# Patient Record
Sex: FEMALE | Race: BLACK OR AFRICAN AMERICAN | NOT HISPANIC OR LATINO | URBAN - METROPOLITAN AREA
[De-identification: names, ages, dates, MRNs, and addresses within clinical notes are randomized per-mention and may not be internally consistent; named-entity substitution may affect disease eponyms.]

---

## 2017-10-30 RX ORDER — SIMVASTATIN 20 MG/1
1 TABLET, FILM COATED ORAL
Qty: 0 | Refills: 0 | COMMUNITY

## 2017-10-30 RX ORDER — CHLORHEXIDINE GLUCONATE 213 G/1000ML
1 SOLUTION TOPICAL ONCE
Qty: 0 | Refills: 0 | Status: DISCONTINUED | OUTPATIENT
Start: 2017-11-01 | End: 2017-11-01

## 2017-10-30 NOTE — H&P ADULT - NSHPLABSRESULTS_GEN_ALL_CORE
12.3   4.8   )-----------( 236      ( 01 Nov 2017 07:28 )             38.1     11-01    142  |  101  |  18  ----------------------------<  157<H>  5.3   |  28  |  1.08    Ca    10.6<H>      01 Nov 2017 07:28      PT/INR - ( 01 Nov 2017 07:28 )   PT: 10.9 sec;   INR: 0.98          PTT - ( 01 Nov 2017 07:28 )  PTT:34.4 sec    CARDIAC MARKERS ( 01 Nov 2017 07:28 )  x     / x     / 117 U/L / x     / 2.4 ng/mL

## 2017-10-30 NOTE — H&P ADULT - ASSESSMENT
61 y/o F w/ FMHX of CAD, PMHX HTN, HLD, DM, H/o PUD s/p Upper GI Bleed 3-4 yrs ago treated w/ cauterization by EGD, H/o Anemia, Chronic Lower Back Pain w/ CCS Angina Equivalent Class 3 Sx and abnormal NST.     Due to patient's risk factors, CCS Angina Class III Equivalent Symptoms and abnormal Nuclear Stress Test, pt referred for cardiac cath. w/ possible intervention.       H/H 12.3/38 today. H/H is stable and was 12.2/38 on 9/16/17. Pt reports h/o EGD/Colonoscopy last year showing no bleed. Since her EGD w/ cauterization of upper GI bleed 3-4 yrs ago pt denies melena, BRBPR, hematemesis.   Pt denies current bleeding, GI bleeding, hematemesis, hematuria, BRBPR or melena.   ASA 325mg and Plavix 600mg PO pre-cath as per Dr. Garcia.  Cr. 1.08, IV NS@ 75cc/hr pre-cath.  K 5.3, no hemolysis, Dr. Garcia aware pt on Lisinopril 10mg daily.   Risks & benefits of procedure and alternative therapy have been explained to the patient including but not limited to: allergic reaction, bleeding w/possible need for blood transfusion, infection, renal and vascular compromise, limb damage, arrhythmia, stroke, vessel dissection/perforation, Myocardial infarction, emergent CABG. Informed consent obtained and in chart.     Case discussed w/ Dr. Garcia. 63 y/o F w/ FMHX of CAD, PMHX HTN, HLD, DM, H/o PUD s/p Upper GI Bleed 3-4 yrs ago treated w/ cauterization by EGD, H/o Anemia, Chronic Lower Back Pain w/ CCS Angina Equivalent Class 3 Sx and abnormal NST.     Due to patient's risk factors, CCS Angina Class III Equivalent Symptoms and abnormal Nuclear Stress Test, pt referred for cardiac cath. w/ possible intervention.       H/H 12.3/38 today. H/H is stable and was 12.2/38 on 9/16/17. Pt reports h/o EGD/Colonoscopy last year showing no bleed. Since her EGD w/ cauterization of upper GI bleed 3-4 yrs ago pt denies melena, BRBPR, hematemesis.   Pt denies current bleeding, GI bleeding, hematemesis, hematuria, BRBPR or melena.   ASA 325mg and Plavix 600mg PO pre-cath as per Dr. Garcia.  Cr. 1.08, IV NS@ 75cc/hr pre-cath.  K 5.3, no hemolysis, Dr. Garcia aware pt on Lisinopril 10mg daily.   Sedation Plan: Moderate  Patient is Suitable Candidate for Sedation: Yes  Risks & benefits of procedure and alternative therapy have been explained to the patient including but not limited to: allergic reaction, bleeding w/possible need for blood transfusion, infection, renal and vascular compromise, limb damage, arrhythmia, stroke, vessel dissection/perforation, Myocardial infarction, emergent CABG. Informed consent obtained and in chart.     Case discussed w/ Dr. Garcia.

## 2017-10-30 NOTE — H&P ADULT - NSHPREVIEWOFSYSTEMS_GEN_ALL_CORE
GEN Denies fever, chills,  SKIN Denies rash, pruritis, lesions/sores   HEENT Denies HA, vision changes, hearing changes  LUNG Denies cough, sputum, wheeze, hemoptysis, pleurisy  CV  + SOB, Denies C/P, palpitations, orthopnea, PND, claudication, syncope  GI Denies abdominal pain, dysphagia, GERD, N/V/D, melena, BRBPR, hematemesis   URINARY Denies polyuria, urgency, dysuria, hematuria, 		  MSK +Back Pain, Denies gait abnl, limited ROM  NEUROLOGY +LE numbness/tingling, Denies fainting, seizures, tremor, speech changes  PSYCH Denies depression, anxiety, hallucinations, suicidal ideation

## 2017-10-30 NOTE — H&P ADULT - HISTORY OF PRESENT ILLNESS
60 yo female with a PMHx of HTN, hyperlipidemia, DMII presented to cardiologist with the complaint of MONTERO.     Nuclear Stress Test (10/6/2017) revealed small sized, mild intensity, reversible defect in the apical anterior wall suggestive of ischemia. LVEF 77%    In light of patient's risk factors, CCS Angina Class III Equivalent Symptoms and abnormal Nuclear Stress Test, pt now presents for cardiac catheterization with possible intervention if clinically indicated. 63 y/o F w/ FMHX of CAD, PMHX HTN, HLD, DM, H/o PUD s/p Upper GI Bleed 3-4 yrs ago treated w/ cauterization by EGD (reports h/o EGD/Colonoscopy last year showing no bleed), H/o Anemia diagnosed 3-4 yrs ago during upper GI bleed (denies h/o blood transfusion, currently on Iron supplement and monthly B12 injections, last H/H 12.2/38.3 on 9/16/17 per her MD office), Chronic Lower Back Pain 2nd to Herniated Disc and Prior Fall, who presented to her cardiologist c/o MONTERO on walking 2 blocks or when climbing 2 flights of stairs (CCS Angina Equivalent Class 3 Sx). Associated sx include dizziness, diaphoresis and fatigue. Pt denies CP, palpitations, orthopnea, PND, syncope.  Pt had a Nuclear Stress Test (10/6/2017) revealing a small sized, mild intensity, reversible defect in the apical anterior wall suggestive of ischemia. LVEF 77%. Since her EGD w/ cauterization of upper GI bleed 3-4 yrs ago pt denies melena, BRBPR, hematemesis.    Due to patient's risk factors, CCS Angina Class III Equivalent Symptoms and abnormal Nuclear Stress Test, pt referred for cardiac cath. w/ possible intervention.     Of note, pt reports h/o small R fibroid cyst (no surgical intervention required) which causes intermittent mild R sided groin/pelvic pain.

## 2017-10-30 NOTE — H&P ADULT - FAMILY HISTORY
Father  Still living? Unknown  Family history of CHF (congestive heart failure), Age at diagnosis: Age Unknown     Sibling  Still living? Unknown  Family history of heart disease, Age at diagnosis: Age Unknown Father  Still living? Unknown  Family history of CHF (congestive heart failure), Age at diagnosis: Age Unknown     Sibling  Still living? Unknown  Family history of heart disease, Age at diagnosis: Age Unknown  Family history of acute myocardial infarction, Age at diagnosis: 51-60     Sibling  Still living? Unknown  Family history of acute myocardial infarction, Age at diagnosis: 41-50

## 2017-10-30 NOTE — H&P ADULT - NSHPPHYSICALEXAM_GEN_ALL_CORE
V/S 	BP: 150/87 	  HR: 58	    RR: 18    T: 97.5	  O2: 100% RA   	  GEN: NAD  PULM:  CTA B/L  CARD: No JVD B/L, RRR, S1 and S2   ABD: +BS, NT, soft/ND	  EXT: No Edema B/L LE  NEURO: A+Ox3, no focal deficit  PULSES: 2+ Radial b/l, 2+ Femoral b/l (no bruit b/l), DP 2+ b/l, R PT 1+, L PT Faint   ASA III, Mallampati III  EKG: Sinus Bradycardia @ 58bpm w/ j point elevation 0.5mm in V2, TWI in AVL

## 2017-11-01 ENCOUNTER — OUTPATIENT (OUTPATIENT)
Dept: OUTPATIENT SERVICES | Facility: HOSPITAL | Age: 62
LOS: 1 days | Discharge: NURSING, REHAB OR LONG-TERM CARE | End: 2017-11-01
Payer: COMMERCIAL

## 2017-11-01 DIAGNOSIS — Z98.42 CATARACT EXTRACTION STATUS, LEFT EYE: Chronic | ICD-10-CM

## 2017-11-01 LAB
ANION GAP SERPL CALC-SCNC: 13 MMOL/L — SIGNIFICANT CHANGE UP (ref 5–17)
APTT BLD: 34.4 SEC — SIGNIFICANT CHANGE UP (ref 27.5–37.4)
BASOPHILS NFR BLD AUTO: 0.2 % — SIGNIFICANT CHANGE UP (ref 0–2)
BUN SERPL-MCNC: 18 MG/DL — SIGNIFICANT CHANGE UP (ref 7–23)
CALCIUM SERPL-MCNC: 10.6 MG/DL — HIGH (ref 8.4–10.5)
CHLORIDE SERPL-SCNC: 101 MMOL/L — SIGNIFICANT CHANGE UP (ref 96–108)
CHOLEST SERPL-MCNC: 139 MG/DL — SIGNIFICANT CHANGE UP (ref 10–199)
CK MB CFR SERPL CALC: 2.4 NG/ML — SIGNIFICANT CHANGE UP (ref 0–6.7)
CO2 SERPL-SCNC: 28 MMOL/L — SIGNIFICANT CHANGE UP (ref 22–31)
CREAT SERPL-MCNC: 1.08 MG/DL — SIGNIFICANT CHANGE UP (ref 0.5–1.3)
CRP SERPL-MCNC: 1.3 MG/DL — HIGH (ref 0–0.4)
EOSINOPHIL NFR BLD AUTO: 3.5 % — SIGNIFICANT CHANGE UP (ref 0–6)
GLUCOSE BLDC GLUCOMTR-MCNC: 128 MG/DL — HIGH (ref 70–99)
GLUCOSE BLDC GLUCOMTR-MCNC: 69 MG/DL — LOW (ref 70–99)
GLUCOSE SERPL-MCNC: 157 MG/DL — HIGH (ref 70–99)
HBA1C BLD-MCNC: 6.9 % — HIGH (ref 4–5.6)
HCT VFR BLD CALC: 38.1 % — SIGNIFICANT CHANGE UP (ref 34.5–45)
HDLC SERPL-MCNC: 35 MG/DL — LOW (ref 40–125)
HGB BLD-MCNC: 12.3 G/DL — SIGNIFICANT CHANGE UP (ref 11.5–15.5)
INR BLD: 0.98 — SIGNIFICANT CHANGE UP (ref 0.88–1.16)
LIPID PNL WITH DIRECT LDL SERPL: 84 MG/DL — SIGNIFICANT CHANGE UP
LYMPHOCYTES # BLD AUTO: 34.6 % — SIGNIFICANT CHANGE UP (ref 13–44)
MCHC RBC-ENTMCNC: 29.4 PG — SIGNIFICANT CHANGE UP (ref 27–34)
MCHC RBC-ENTMCNC: 32.3 G/DL — SIGNIFICANT CHANGE UP (ref 32–36)
MCV RBC AUTO: 91.1 FL — SIGNIFICANT CHANGE UP (ref 80–100)
MONOCYTES NFR BLD AUTO: 9.4 % — SIGNIFICANT CHANGE UP (ref 2–14)
NEUTROPHILS NFR BLD AUTO: 52.3 % — SIGNIFICANT CHANGE UP (ref 43–77)
PLATELET # BLD AUTO: 236 K/UL — SIGNIFICANT CHANGE UP (ref 150–400)
POTASSIUM SERPL-MCNC: 5.3 MMOL/L — SIGNIFICANT CHANGE UP (ref 3.5–5.3)
POTASSIUM SERPL-SCNC: 5.3 MMOL/L — SIGNIFICANT CHANGE UP (ref 3.5–5.3)
PROTHROM AB SERPL-ACNC: 10.9 SEC — SIGNIFICANT CHANGE UP (ref 9.8–12.7)
RBC # BLD: 4.18 M/UL — SIGNIFICANT CHANGE UP (ref 3.8–5.2)
RBC # FLD: 13.2 % — SIGNIFICANT CHANGE UP (ref 10.3–16.9)
SODIUM SERPL-SCNC: 142 MMOL/L — SIGNIFICANT CHANGE UP (ref 135–145)
TOTAL CHOLESTEROL/HDL RATIO MEASUREMENT: 4 RATIO — SIGNIFICANT CHANGE UP (ref 3.3–7.1)
TRIGL SERPL-MCNC: 100 MG/DL — SIGNIFICANT CHANGE UP (ref 10–149)
WBC # BLD: 4.8 K/UL — SIGNIFICANT CHANGE UP (ref 3.8–10.5)
WBC # FLD AUTO: 4.8 K/UL — SIGNIFICANT CHANGE UP (ref 3.8–10.5)

## 2017-11-01 PROCEDURE — 82962 GLUCOSE BLOOD TEST: CPT

## 2017-11-01 PROCEDURE — 80061 LIPID PANEL: CPT

## 2017-11-01 PROCEDURE — 83036 HEMOGLOBIN GLYCOSYLATED A1C: CPT

## 2017-11-01 PROCEDURE — 82553 CREATINE MB FRACTION: CPT

## 2017-11-01 PROCEDURE — C1887: CPT

## 2017-11-01 PROCEDURE — 82550 ASSAY OF CK (CPK): CPT

## 2017-11-01 PROCEDURE — 80048 BASIC METABOLIC PNL TOTAL CA: CPT

## 2017-11-01 PROCEDURE — C1769: CPT

## 2017-11-01 PROCEDURE — 86140 C-REACTIVE PROTEIN: CPT

## 2017-11-01 PROCEDURE — 93005 ELECTROCARDIOGRAM TRACING: CPT

## 2017-11-01 PROCEDURE — 93454 CORONARY ARTERY ANGIO S&I: CPT

## 2017-11-01 PROCEDURE — 85610 PROTHROMBIN TIME: CPT

## 2017-11-01 PROCEDURE — 93010 ELECTROCARDIOGRAM REPORT: CPT

## 2017-11-01 PROCEDURE — 85025 COMPLETE CBC W/AUTO DIFF WBC: CPT

## 2017-11-01 PROCEDURE — 85730 THROMBOPLASTIN TIME PARTIAL: CPT

## 2017-11-01 PROCEDURE — 93454 CORONARY ARTERY ANGIO S&I: CPT | Mod: 26

## 2017-11-01 RX ORDER — PREGABALIN 225 MG/1
0 CAPSULE ORAL
Qty: 0 | Refills: 0 | COMMUNITY

## 2017-11-01 RX ORDER — ACETAMINOPHEN WITH CODEINE 300MG-30MG
1 TABLET ORAL
Qty: 0 | Refills: 0 | COMMUNITY

## 2017-11-01 RX ORDER — CLOPIDOGREL BISULFATE 75 MG/1
600 TABLET, FILM COATED ORAL ONCE
Qty: 0 | Refills: 0 | Status: COMPLETED | OUTPATIENT
Start: 2017-11-01 | End: 2017-11-01

## 2017-11-01 RX ORDER — DEXTROSE 50 % IN WATER 50 %
1 SYRINGE (ML) INTRAVENOUS ONCE
Qty: 0 | Refills: 0 | Status: DISCONTINUED | OUTPATIENT
Start: 2017-11-01 | End: 2017-11-01

## 2017-11-01 RX ORDER — SODIUM CHLORIDE 9 MG/ML
500 INJECTION INTRAMUSCULAR; INTRAVENOUS; SUBCUTANEOUS
Qty: 0 | Refills: 0 | Status: DISCONTINUED | OUTPATIENT
Start: 2017-11-01 | End: 2017-11-01

## 2017-11-01 RX ORDER — SOLIFENACIN SUCCINATE 10 MG/1
1 TABLET ORAL
Qty: 0 | Refills: 0 | COMMUNITY

## 2017-11-01 RX ORDER — METFORMIN HYDROCHLORIDE 850 MG/1
1 TABLET ORAL
Qty: 0 | Refills: 0 | COMMUNITY

## 2017-11-01 RX ORDER — FERROUS SULFATE 325(65) MG
1 TABLET ORAL
Qty: 0 | Refills: 0 | COMMUNITY

## 2017-11-01 RX ORDER — GLUCAGON INJECTION, SOLUTION 0.5 MG/.1ML
1 INJECTION, SOLUTION SUBCUTANEOUS ONCE
Qty: 0 | Refills: 0 | Status: DISCONTINUED | OUTPATIENT
Start: 2017-11-01 | End: 2017-11-01

## 2017-11-01 RX ORDER — SODIUM CHLORIDE 9 MG/ML
1000 INJECTION, SOLUTION INTRAVENOUS
Qty: 0 | Refills: 0 | Status: DISCONTINUED | OUTPATIENT
Start: 2017-11-01 | End: 2017-11-01

## 2017-11-01 RX ORDER — DEXTROSE 50 % IN WATER 50 %
25 SYRINGE (ML) INTRAVENOUS ONCE
Qty: 0 | Refills: 0 | Status: DISCONTINUED | OUTPATIENT
Start: 2017-11-01 | End: 2017-11-01

## 2017-11-01 RX ORDER — TIMOLOL 0.5 %
1 DROPS OPHTHALMIC (EYE)
Qty: 0 | Refills: 0 | COMMUNITY

## 2017-11-01 RX ORDER — LISINOPRIL 2.5 MG/1
1 TABLET ORAL
Qty: 0 | Refills: 0 | COMMUNITY

## 2017-11-01 RX ORDER — GLYBURIDE 5 MG
1 TABLET ORAL
Qty: 0 | Refills: 0 | COMMUNITY

## 2017-11-01 RX ORDER — LATANOPROST 0.05 MG/ML
1 SOLUTION/ DROPS OPHTHALMIC; TOPICAL
Qty: 0 | Refills: 0 | COMMUNITY

## 2017-11-01 RX ORDER — DEXTROSE 50 % IN WATER 50 %
12.5 SYRINGE (ML) INTRAVENOUS ONCE
Qty: 0 | Refills: 0 | Status: DISCONTINUED | OUTPATIENT
Start: 2017-11-01 | End: 2017-11-01

## 2017-11-01 RX ORDER — MORPHINE SULFATE 50 MG/1
1 CAPSULE, EXTENDED RELEASE ORAL ONCE
Qty: 0 | Refills: 0 | Status: DISCONTINUED | OUTPATIENT
Start: 2017-11-01 | End: 2017-11-01

## 2017-11-01 RX ORDER — ASPIRIN/CALCIUM CARB/MAGNESIUM 324 MG
325 TABLET ORAL ONCE
Qty: 0 | Refills: 0 | Status: COMPLETED | OUTPATIENT
Start: 2017-11-01 | End: 2017-11-01

## 2017-11-01 RX ORDER — SIMVASTATIN 20 MG/1
1 TABLET, FILM COATED ORAL
Qty: 0 | Refills: 0 | COMMUNITY

## 2017-11-01 RX ORDER — INSULIN LISPRO 100/ML
VIAL (ML) SUBCUTANEOUS ONCE
Qty: 0 | Refills: 0 | Status: COMPLETED | OUTPATIENT
Start: 2017-11-01 | End: 2017-11-01

## 2017-11-01 RX ADMIN — MORPHINE SULFATE 1 MILLIGRAM(S): 50 CAPSULE, EXTENDED RELEASE ORAL at 16:41

## 2017-11-01 RX ADMIN — SODIUM CHLORIDE 75 MILLILITER(S): 9 INJECTION INTRAMUSCULAR; INTRAVENOUS; SUBCUTANEOUS at 08:17

## 2017-11-01 RX ADMIN — Medication 325 MILLIGRAM(S): at 08:33

## 2017-11-01 RX ADMIN — CLOPIDOGREL BISULFATE 600 MILLIGRAM(S): 75 TABLET, FILM COATED ORAL at 08:33

## 2017-11-01 RX ADMIN — Medication: at 08:34

## 2017-11-01 RX ADMIN — MORPHINE SULFATE 1 MILLIGRAM(S): 50 CAPSULE, EXTENDED RELEASE ORAL at 15:25

## 2017-11-01 NOTE — PROGRESS NOTE ADULT - SUBJECTIVE AND OBJECTIVE BOX
Interventional Cardiology PA SDA Discharge Note    Patient without complaints. Ambulated and voided without difficulties    Afebrile, VSS    Ext:    		Right radial: no hematoma, no bleed, 2+ radial pulse    A/P:  61 y/o F w/ FMHX of CAD, PMHX HTN, HLD, DM, H/o PUD s/p Upper GI Bleed 3-4 yrs ago treated w/ cauterization by EGD (reports h/o EGD/Colonoscopy last year showing no bleed), H/o Anemia diagnosed 3-4 yrs ago during upper GI bleed (denies h/o blood transfusion, currently on Iron supplement and monthly B12 injections, last H/H 12.2/38.3 on 9/16/17 per her MD office), Chronic Lower Back Pain 2nd to Herniated Disc and Prior Fall, who presented to her cardiologist c/o MONTERO on walking 2 blocks or when climbing 2 flights of stairs (CCS Angina Equivalent Class 3 Sx). Associated sx include dizziness, diaphoresis and fatigue. Pt denies CP, palpitations, orthopnea, PND, syncope.  Pt had a Nuclear Stress Test (10/6/2017) revealing a small sized, mild intensity, reversible defect in the apical anterior wall suggestive of ischemia. LVEF 77%. Since her EGD w/ cauterization of upper GI bleed 3-4 yrs ago pt denies melena, BRBPR, hematemesis. Due to patient's risk factors, CCS Angina Class III Equivalent Symptoms and abnormal Nuclear Stress Test, pt referred for cardiac catheterization today revealing                       1.	Stable for discharge as per attending  __Jose_______ after bed rest, pt voids, groin/wrist stable and 30 minutes of ambulation.  2.	Follow-up with PMD/Cardiologist ____Jose__________ in 1-2 weeks  3.	Discharged forms signed and copies in chart Interventional Cardiology PA SDA Discharge Note    Patient without complaints. Ambulated and voided without difficulties    Afebrile, VSS    Ext:    		Right radial: no hematoma, no bleed, 2+ radial pulse    A/P:  61 y/o F w/ FMHX of CAD, PMHX HTN, HLD, DM, H/o PUD s/p Upper GI Bleed 3-4 yrs ago treated w/ cauterization by EGD (reports h/o EGD/Colonoscopy last year showing no bleed), H/o Anemia diagnosed 3-4 yrs ago during upper GI bleed (denies h/o blood transfusion, currently on Iron supplement and monthly B12 injections, last H/H 12.2/38.3 on 9/16/17 per her MD office), Chronic Lower Back Pain 2nd to Herniated Disc and Prior Fall, who presented to her cardiologist c/o MONTERO on walking 2 blocks or when climbing 2 flights of stairs (CCS Angina Equivalent Class 3 Sx). Associated sx include dizziness, diaphoresis and fatigue. Pt denies CP, palpitations, orthopnea, PND, syncope.  Pt had a Nuclear Stress Test (10/6/2017) revealing a small sized, mild intensity, reversible defect in the apical anterior wall suggestive of ischemia. LVEF 77%. Since her EGD w/ cauterization of upper GI bleed 3-4 yrs ago pt denies melena, BRBPR, hematemesis. Due to patient's risk factors, CCS Angina Class III Equivalent Symptoms and abnormal Nuclear Stress Test, pt referred for cardiac catheterization today revealing non obstructive CAD (pLAD 20% stenosis). For continued medical management.                        1.	Stable for discharge as per attending  __Jose_______ after bed rest, pt voids, groin/wrist stable and 30 minutes of ambulation.  2.	Follow-up with PMD/Cardiologist ____Jose__________ in 1-2 weeks  3.	Discharged forms signed and copies in chart